# Patient Record
Sex: MALE | Race: WHITE | NOT HISPANIC OR LATINO | Employment: FULL TIME | URBAN - METROPOLITAN AREA
[De-identification: names, ages, dates, MRNs, and addresses within clinical notes are randomized per-mention and may not be internally consistent; named-entity substitution may affect disease eponyms.]

---

## 2019-02-24 ENCOUNTER — OFFICE VISIT (OUTPATIENT)
Dept: URGENT CARE | Facility: CLINIC | Age: 29
End: 2019-02-24
Payer: COMMERCIAL

## 2019-02-24 VITALS
SYSTOLIC BLOOD PRESSURE: 124 MMHG | HEIGHT: 74 IN | RESPIRATION RATE: 16 BRPM | WEIGHT: 245 LBS | TEMPERATURE: 98.8 F | HEART RATE: 82 BPM | DIASTOLIC BLOOD PRESSURE: 72 MMHG | BODY MASS INDEX: 31.44 KG/M2 | OXYGEN SATURATION: 96 %

## 2019-02-24 DIAGNOSIS — L02.92 BOIL: ICD-10-CM

## 2019-02-24 PROCEDURE — 10061 I&D ABSCESS COMP/MULTIPLE: CPT | Performed by: PHYSICIAN ASSISTANT

## 2019-02-24 RX ORDER — SULFAMETHOXAZOLE AND TRIMETHOPRIM 800; 160 MG/1; MG/1
1 TABLET ORAL 2 TIMES DAILY
Qty: 14 TAB | Refills: 0 | Status: SHIPPED | OUTPATIENT
Start: 2019-02-24 | End: 2019-03-03

## 2019-02-24 ASSESSMENT — ENCOUNTER SYMPTOMS
PALPITATIONS: 0
FEVER: 0
SHORTNESS OF BREATH: 0
COUGH: 0

## 2019-02-24 NOTE — PATIENT INSTRUCTIONS
Abscess  Care After  An abscess (also called a boil or furuncle) is an infected area that contains a collection of pus. Signs and symptoms of an abscess include pain, tenderness, redness, or hardness, or you may feel a moveable soft area under your skin. An abscess can occur anywhere in the body. The infection may spread to surrounding tissues causing cellulitis. A cut (incision) by the surgeon was made over your abscess and the pus was drained out. Gauze may have been packed into the space to provide a drain that will allow the cavity to heal from the inside outwards. The boil may be painful for 5 to 7 days. Most people with a boil do not have high fevers. Your abscess, if seen early, may not have localized, and may not have been lanced. If not, another appointment may be required for this if it does not get better on its own or with medications.  HOME CARE INSTRUCTIONS   · Only take over-the-counter or prescription medicines for pain, discomfort, or fever as directed by your caregiver.  · When you bathe, soak and then remove gauze or iodoform packs at least daily or as directed by your caregiver. You may then wash the wound gently with mild soapy water. Repack with gauze or do as your caregiver directs.  SEEK IMMEDIATE MEDICAL CARE IF:   · You develop increased pain, swelling, redness, drainage, or bleeding in the wound site.  · You develop signs of generalized infection including muscle aches, chills, fever, or a general ill feeling.  · An oral temperature above 102° F (38.9° C) develops, not controlled by medication.  See your caregiver for a recheck if you develop any of the symptoms described above. If medications (antibiotics) were prescribed, take them as directed.  Document Released: 07/06/2006 Document Revised: 03/11/2013 Document Reviewed: 03/02/2009  [employer]® Patient Information ©2014 [employer].

## 2019-02-24 NOTE — PROGRESS NOTES
"Subjective:      Nicola Merrill is a 28 y.o. male who presents with Cyst (x3days, boil on right shoulder below collar bone starting to swell up, pressure, another cyston upper right thigh)            Cyst   This is a new problem. The current episode started in the past 7 days. The problem occurs constantly. The problem has been gradually worsening. Pertinent negatives include no chest pain, coughing, fever or rash. Nothing aggravates the symptoms. He has tried nothing for the symptoms.       Review of Systems   Constitutional: Negative for fever and malaise/fatigue.   Respiratory: Negative for cough and shortness of breath.    Cardiovascular: Negative for chest pain and palpitations.   Skin: Negative for itching and rash.        Boil on right shoulder     All other systems reviewed and are negative.    PMH:  has no past medical history on file.  MEDS:   Current Outpatient Prescriptions:   •  sulfamethoxazole-trimethoprim (BACTRIM DS) 800-160 MG tablet, Take 1 Tab by mouth 2 times a day for 7 days., Disp: 14 Tab, Rfl: 0  ALLERGIES: No Known Allergies  SURGHX: No past surgical history on file.  SOCHX:  reports that he has never smoked. He has never used smokeless tobacco.  FH: Family history was reviewed, no pertinent findings to report  Medications, Allergies, and current problem list reviewed today in Epic       Objective:     /72 (BP Location: Left arm, Patient Position: Sitting, BP Cuff Size: Adult)   Pulse 82   Temp 37.1 °C (98.8 °F) (Temporal)   Resp 16   Ht 1.88 m (6' 2\")   Wt 111.1 kg (245 lb)   SpO2 96%   BMI 31.46 kg/m²      Physical Exam   Constitutional: He appears well-developed and well-nourished.   Cardiovascular: Normal rate, regular rhythm and normal heart sounds.    Pulmonary/Chest: Effort normal and breath sounds normal.   Skin: Skin is warm and dry. There is erythema.        Psychiatric: He has a normal mood and affect. His behavior is normal. Judgment and thought content normal.   " "  Vitals reviewed.              Assessment/Plan:   Procedure: Incision and Drainage  -Risks, benefits, and alternatives discussed. Risks including infection, bleeding, nerve damage, and poor cosmetic outcome  -Sterile technique throughout  -Local anesthesia with 2% lidocaine with epinephrine  -Incision with #11 blade into fluctuant area with purulent material expressed  -Cavity probed and any loculations bluntly taken down with hemostat  -Irrigated copiously with NS  -Packed with 1/4\" gauze  -Minimal bleeding with good hemostasis achieved  -The patient tolerated the procedure well    1. Boil    - sulfamethoxazole-trimethoprim (BACTRIM DS) 800-160 MG tablet; Take 1 Tab by mouth 2 times a day for 7 days.  Dispense: 14 Tab; Refill: 0    Differential diagnosis, natural history, supportive care discussed. Follow-up with primary care provider within 7-10 days, emergency room precautions discussed.  Patient and/or family appears understanding of information.  Handout and review of patients diagnosis and treatment was discussed extensively.     "